# Patient Record
Sex: MALE | Race: WHITE | NOT HISPANIC OR LATINO | URBAN - METROPOLITAN AREA
[De-identification: names, ages, dates, MRNs, and addresses within clinical notes are randomized per-mention and may not be internally consistent; named-entity substitution may affect disease eponyms.]

---

## 2019-02-04 ENCOUNTER — EMERGENCY (EMERGENCY)
Facility: HOSPITAL | Age: 74
LOS: 1 days | End: 2019-02-04
Payer: COMMERCIAL

## 2019-02-04 PROCEDURE — 93971 EXTREMITY STUDY: CPT | Mod: 26,LT

## 2019-02-04 PROCEDURE — 99284 EMERGENCY DEPT VISIT MOD MDM: CPT

## 2019-02-08 PROBLEM — Z00.00 ENCOUNTER FOR PREVENTIVE HEALTH EXAMINATION: Status: ACTIVE | Noted: 2019-02-08

## 2019-02-20 ENCOUNTER — APPOINTMENT (OUTPATIENT)
Dept: VASCULAR SURGERY | Facility: CLINIC | Age: 74
End: 2019-02-20
Payer: COMMERCIAL

## 2019-02-20 VITALS
HEART RATE: 59 BPM | WEIGHT: 315 LBS | DIASTOLIC BLOOD PRESSURE: 120 MMHG | BODY MASS INDEX: 45.1 KG/M2 | TEMPERATURE: 97.6 F | SYSTOLIC BLOOD PRESSURE: 161 MMHG | OXYGEN SATURATION: 96 % | HEIGHT: 70 IN

## 2019-02-20 PROCEDURE — 99203 OFFICE O/P NEW LOW 30 MIN: CPT

## 2019-02-20 NOTE — HISTORY OF PRESENT ILLNESS
[FreeTextEntry1] : 73 year old male with morbid obesity and multiple bilateral lower extremity orthopedic interventions presents for evaluation of bilateral leg swelling.\par He reports that swelling in both legs has been going on for over 10 years. Swelling worsens through the day and improves overnight. he has associated heaviness and dull pain in both legs. he was recently seen in the ED at [facility] for left leg cellulitis and has had several previous episodes of cellulitis in both legs. he has tried to use compression stockings but has been poorly tolerated likely due to his leg girth.\par No previous DVT

## 2019-02-20 NOTE — PHYSICAL EXAM
[JVD] : no jugular venous distention  [Normal Breath Sounds] : Normal breath sounds [Normal Heart Sounds] : normal heart sounds [2+] : left 2+ [1+] : left 1+ [Ankle Swelling (On Exam)] : present [Ankle Swelling Bilaterally] : bilaterally  [Varicose Veins Of Lower Extremities] : not present [] : bilaterally [Ankle Swelling On The Left] : moderate [Purpura] : no purpura  [Alert] : alert [Oriented to Person] : oriented to person [Oriented to Place] : oriented to place [Oriented to Time] : oriented to time [Calm] : calm [de-identified] : Well appearing, obese [de-identified] : NCAT [FreeTextEntry1] : + Stemmers sign

## 2019-02-20 NOTE — ASSESSMENT
[FreeTextEntry1] : 73 year old male with bilateral lower extremity chronic swelling and recurrent cellulitis.\par His leg swelling is likely multifactorial with combined venous insufficiency and lymphedema.\par He has been unsuccessful with over the counter compression stockings\par He will benefit from a regimen of compression with stockings and a pneumatic pump.\par I will obtain a venous ultrasound to evaluate for deep and superficial reflux.\par Patient also counselled on importance of weight loss as this will help improve his leg swelling

## 2019-03-06 ENCOUNTER — APPOINTMENT (OUTPATIENT)
Dept: VASCULAR SURGERY | Facility: CLINIC | Age: 74
End: 2019-03-06
Payer: COMMERCIAL

## 2019-03-06 PROCEDURE — 93970 EXTREMITY STUDY: CPT
